# Patient Record
Sex: FEMALE | Race: WHITE | NOT HISPANIC OR LATINO | Employment: UNEMPLOYED | URBAN - METROPOLITAN AREA
[De-identification: names, ages, dates, MRNs, and addresses within clinical notes are randomized per-mention and may not be internally consistent; named-entity substitution may affect disease eponyms.]

---

## 2024-01-12 ENCOUNTER — APPOINTMENT (OUTPATIENT)
Dept: ULTRASOUND IMAGING | Facility: CLINIC | Age: 34
End: 2024-01-12
Attending: EMERGENCY MEDICINE
Payer: COMMERCIAL

## 2024-01-12 ENCOUNTER — HOSPITAL ENCOUNTER (EMERGENCY)
Facility: CLINIC | Age: 34
Discharge: HOME | End: 2024-01-12
Attending: EMERGENCY MEDICINE | Admitting: EMERGENCY MEDICINE
Payer: COMMERCIAL

## 2024-01-12 VITALS
HEART RATE: 81 BPM | DIASTOLIC BLOOD PRESSURE: 65 MMHG | TEMPERATURE: 98 F | OXYGEN SATURATION: 100 % | SYSTOLIC BLOOD PRESSURE: 104 MMHG | RESPIRATION RATE: 13 BRPM

## 2024-01-12 DIAGNOSIS — O03.4 INCOMPLETE MISCARRIAGE: ICD-10-CM

## 2024-01-12 DIAGNOSIS — N93.9 VAGINAL BLEEDING: ICD-10-CM

## 2024-01-12 LAB
ABO/RH(D): ABNORMAL
ANTIBODY ID: NORMAL
ANTIBODY SCREEN: POSITIVE
BASOPHILS # BLD AUTO: 0 10E3/UL (ref 0–0.2)
BASOPHILS NFR BLD AUTO: 1 %
EOSINOPHIL # BLD AUTO: 0.1 10E3/UL (ref 0–0.7)
EOSINOPHIL NFR BLD AUTO: 2 %
ERYTHROCYTE [DISTWIDTH] IN BLOOD BY AUTOMATED COUNT: 11.9 % (ref 10–15)
HCG INTACT+B SERPL-ACNC: 4496 MIU/ML
HCT VFR BLD AUTO: 38.3 % (ref 35–47)
HGB BLD-MCNC: 12.9 G/DL (ref 11.7–15.7)
HOLD SPECIMEN: NORMAL
IMM GRANULOCYTES # BLD: 0 10E3/UL
IMM GRANULOCYTES NFR BLD: 0 %
LYMPHOCYTES # BLD AUTO: 2 10E3/UL (ref 0.8–5.3)
LYMPHOCYTES NFR BLD AUTO: 31 %
MCH RBC QN AUTO: 32.1 PG (ref 26.5–33)
MCHC RBC AUTO-ENTMCNC: 33.7 G/DL (ref 31.5–36.5)
MCV RBC AUTO: 95 FL (ref 78–100)
MONOCYTES # BLD AUTO: 0.4 10E3/UL (ref 0–1.3)
MONOCYTES NFR BLD AUTO: 6 %
NEUTROPHILS # BLD AUTO: 3.9 10E3/UL (ref 1.6–8.3)
NEUTROPHILS NFR BLD AUTO: 60 %
NRBC # BLD AUTO: 0 10E3/UL
NRBC BLD AUTO-RTO: 0 /100
PLATELET # BLD AUTO: 229 10E3/UL (ref 150–450)
RBC # BLD AUTO: 4.02 10E6/UL (ref 3.8–5.2)
SPECIMEN EXPIRATION DATE: ABNORMAL
SPECIMEN EXPIRATION DATE: NORMAL
WBC # BLD AUTO: 6.5 10E3/UL (ref 4–11)

## 2024-01-12 PROCEDURE — 88342 IMHCHEM/IMCYTCHM 1ST ANTB: CPT | Mod: 26 | Performed by: PATHOLOGY

## 2024-01-12 PROCEDURE — 76801 OB US < 14 WKS SINGLE FETUS: CPT

## 2024-01-12 PROCEDURE — 88182 CELL MARKER STUDY: CPT | Performed by: OBSTETRICS & GYNECOLOGY

## 2024-01-12 PROCEDURE — 86900 BLOOD TYPING SEROLOGIC ABO: CPT | Performed by: EMERGENCY MEDICINE

## 2024-01-12 PROCEDURE — 96361 HYDRATE IV INFUSION ADD-ON: CPT

## 2024-01-12 PROCEDURE — 88342 IMHCHEM/IMCYTCHM 1ST ANTB: CPT | Mod: TC,XU | Performed by: OBSTETRICS & GYNECOLOGY

## 2024-01-12 PROCEDURE — 88305 TISSUE EXAM BY PATHOLOGIST: CPT | Mod: TC | Performed by: OBSTETRICS & GYNECOLOGY

## 2024-01-12 PROCEDURE — 258N000003 HC RX IP 258 OP 636: Performed by: EMERGENCY MEDICINE

## 2024-01-12 PROCEDURE — 84702 CHORIONIC GONADOTROPIN TEST: CPT | Performed by: EMERGENCY MEDICINE

## 2024-01-12 PROCEDURE — 36415 COLL VENOUS BLD VENIPUNCTURE: CPT | Performed by: EMERGENCY MEDICINE

## 2024-01-12 PROCEDURE — 86870 RBC ANTIBODY IDENTIFICATION: CPT | Performed by: EMERGENCY MEDICINE

## 2024-01-12 PROCEDURE — 85025 COMPLETE CBC W/AUTO DIFF WBC: CPT | Performed by: EMERGENCY MEDICINE

## 2024-01-12 PROCEDURE — 96374 THER/PROPH/DIAG INJ IV PUSH: CPT

## 2024-01-12 PROCEDURE — 99285 EMERGENCY DEPT VISIT HI MDM: CPT | Mod: 25

## 2024-01-12 PROCEDURE — 88341 IMHCHEM/IMCYTCHM EA ADD ANTB: CPT | Mod: 26 | Performed by: PATHOLOGY

## 2024-01-12 PROCEDURE — 250N000009 HC RX 250

## 2024-01-12 PROCEDURE — 88305 TISSUE EXAM BY PATHOLOGIST: CPT | Mod: 26 | Performed by: PATHOLOGY

## 2024-01-12 PROCEDURE — 250N000011 HC RX IP 250 OP 636: Performed by: OBSTETRICS & GYNECOLOGY

## 2024-01-12 PROCEDURE — 250N000013 HC RX MED GY IP 250 OP 250 PS 637: Performed by: OBSTETRICS & GYNECOLOGY

## 2024-01-12 RX ORDER — LIDOCAINE HYDROCHLORIDE 10 MG/ML
INJECTION, SOLUTION EPIDURAL; INFILTRATION; INTRACAUDAL; PERINEURAL
Status: COMPLETED
Start: 2024-01-12 | End: 2024-01-12

## 2024-01-12 RX ORDER — DOXYCYCLINE 100 MG/1
100 CAPSULE ORAL EVERY 12 HOURS SCHEDULED
Status: DISCONTINUED | OUTPATIENT
Start: 2024-01-12 | End: 2024-01-12 | Stop reason: HOSPADM

## 2024-01-12 RX ORDER — FENTANYL CITRATE 50 UG/ML
100 INJECTION, SOLUTION INTRAMUSCULAR; INTRAVENOUS ONCE
Status: COMPLETED | OUTPATIENT
Start: 2024-01-12 | End: 2024-01-12

## 2024-01-12 RX ADMIN — FENTANYL CITRATE 50 MCG: 0.05 INJECTION, SOLUTION INTRAMUSCULAR; INTRAVENOUS at 16:34

## 2024-01-12 RX ADMIN — LIDOCAINE HYDROCHLORIDE: 10 INJECTION, SOLUTION EPIDURAL; INFILTRATION; INTRACAUDAL; PERINEURAL at 16:45

## 2024-01-12 RX ADMIN — SODIUM CHLORIDE 1000 ML: 9 INJECTION, SOLUTION INTRAVENOUS at 17:00

## 2024-01-12 RX ADMIN — DOXYCYCLINE HYCLATE 100 MG: 100 CAPSULE ORAL at 18:21

## 2024-01-12 ASSESSMENT — ACTIVITIES OF DAILY LIVING (ADL)
ADLS_ACUITY_SCORE: 35

## 2024-01-12 NOTE — ED TRIAGE NOTES
Patient reports recent miscarriage about 1 week ago.  She reports light bleeding all week.  This morning she was going to a follow up appointment and bled through a pad and her clothing in about 30 minutes.  ABCs intact, A&Ox4.     Triage Assessment (Adult)       Row Name 01/12/24 1043          Triage Assessment    Airway WDL WDL        Respiratory WDL    Respiratory WDL WDL        Skin Circulation/Temperature WDL    Skin Circulation/Temperature WDL WDL        Cardiac WDL    Cardiac WDL WDL        Peripheral/Neurovascular WDL    Peripheral Neurovascular WDL WDL        Cognitive/Neuro/Behavioral WDL    Cognitive/Neuro/Behavioral WDL WDL

## 2024-01-12 NOTE — ED PROVIDER NOTES
History     Chief Complaint:  Vaginal Bleeding       HPI   Alana Clark is a 33 year old female, , presenting with vaginal bleeding. States that 3 weeks ago she began having a spontaneous miscarriage at 8 weeks; notes that the baby stopped growing at 6 weeks. There were plans to schedule a D&C but the patient was uncomfortable with this and preferred to pass on her own. One week ago today she made an appointment with OB. Over the weekend she experienced no abdominal cramping; she passed two large blood clots, and bleeding ever since has been light but steady. States she was going through one pad a day. This morning she was on her way to her appointment when she began bleeding through her pants with two pads on; she was advised to present to the ED. Endorses light cramping at this time. No lightheadedness, shortness of breath, abnormal bleeding or bruising, or history of clotting issues. No medical problems or daily medications. Denies history of previous miscarriages.    Independent Historian:   None - Patient Only    Review of External Notes:   No outpatient clinic notes available for review      Medications:    No daily medications    Past Medical History:    No chronic medical problems    Past Surgical History:    No past surgical history on file.     Physical Exam   Patient Vitals for the past 24 hrs:   BP Temp Temp src Pulse Resp SpO2   24 1043 129/82 98.5  F (36.9  C) Oral 103 18 98 %        Physical Exam  GEN: Adult female, reclined on the stretcher  EYES: PERRL, conjunctiva normal  ENT: Moist mucous membranes. Oropharynx clear. No exudate or lesions.  CV: Normal S1S2. Regular rate and rhythm. No murmurs, rubs, or gallops.  RESP: Clear to auscultation bilaterally. Normal effort. No wheezes, rales, or rhonchi.  GI: Abdomen is soft, nontender and nondistended.  GYN: Normal appearing external genitalia.  Blood with clots in the vaginal vault. Closed cervical os with small amount of bloody  discharge.  MSK: Moves all extremities normally. No edema.  Skin: Warm and dry. No rashes, lesions or ecchymoses. No petechiae.  Neuro: Alert and oriented.  Responds appropriately to all questions and commands. No focal abnormalities appreciated.  Psych: Normal affect. Pleasant.     Emergency Department Course       Imaging:  US OB <14 Weeks W Transvaginal   Final Result   IMPRESSION:       1. No convincing gestational sac, yolk sac or fetal pole.      2. Thickened endometrium (1.9 cm) with areas of apparent internal   vascularity that could reflect retained products of conception given   reported history. Clinically correlate and correlate with beta hCG.      3. Simple appearing fluid within the lower uterine   segment/endocervical canal.       ANGEL KAMINSKI MD            SYSTEM ID:  [id number]             Laboratory:  Labs Ordered and Resulted from Time of ED Arrival to Time of ED Departure   HCG QUANTITATIVE PREGNANCY - Abnormal       Result Value    hCG Quantitative 4,496 (*)    TYPE AND SCREEN, ADULT - Abnormal    ABO/RH(D) A NEG      Antibody Screen Positive (*)     SPECIMEN EXPIRATION DATE [id number]     CBC WITH PLATELETS AND DIFFERENTIAL    WBC Count 6.5      RBC Count 4.02      Hemoglobin 12.9      Hematocrit 38.3      MCV 95      MCH 32.1      MCHC 33.7      RDW 11.9      Platelet Count 229      % Neutrophils 60      % Lymphocytes 31      % Monocytes 6      % Eosinophils 2      % Basophils 1      % Immature Granulocytes 0      NRBCs per 100 WBC 0      Absolute Neutrophils 3.9      Absolute Lymphocytes 2.0      Absolute Monocytes 0.4      Absolute Eosinophils 0.1      Absolute Basophils 0.0      Absolute Immature Granulocytes 0.0      Absolute NRBCs 0.0     ANTIBODY IDENTIFICATION    Antibody Identification Anti-D post RhoGAM      SPECIMEN EXPIRATION DATE [id number]     ABO/RH TYPE AND SCREEN        Emergency Department Course & Assessments:       Interventions:  None    Assessments:  1053 I  entered the patient's room and obtained history.  I performed a exam with RN chaperone.  I reassessed the patient discussed findings.  Discussed recommendations from OB.  The patient is comfortable with the plan.    Independent Interpretation (X-rays, CTs, rhythm strip):  None    Consultations/Discussion of Management or Tests:  I discussed the patient with Dr. Herr of OB/GYN who will plan to do a bedside evacuation versus D&C in the operating room.  Will come to evaluate the patient in person.       Social Determinants of Health affecting care:   None    Disposition:  The patient is awaiting OB evaluation in the ED.  Her care is signed out to my colleague Dr. Llnaes while awaiting definitive care.    Impression & Plan      Medical Decision Making:  Alana Clark 33-year-old  with recent miscarriage with evidence of incomplete miscarriage on today's evaluation.  Probable products of conception are noted on ultrasound.  She has bleeding but not massive hemorrhage.  Hemodynamically normal.  She has a normal hemoglobin.  OB was consulted and recommend likely D&C but will see the patient here in the emergency department for possible bedside manual pump evacuation.  The patient is awaiting this care at this time, remaining stable.  All questions were answered prior to transfer of care.      Diagnosis:    ICD-10-CM    1. Incomplete miscarriage  O03.4       2. Vaginal bleeding  N93.9            Scribe Disclosure:  IBeka Hired, am serving as a scribe at 11:00 AM on 2024 to document services personally performed by Estelita Shipman MD based on my observations and the provider's statements to me.   2024   Estelita Shipman MD Jonkman, Tracy Dianne, MD  24 [phone]

## 2024-01-12 NOTE — H&P
ED H and P:    S: Pt had Missed ab at 9+ weeks with fetal size at 6+ weeks. She had been consulted and scheduled for D and C last week. She decided against the D and C and attempted cytotec. She passed a few blood clots and felt that the miscarriage was complete.   However, today she started with significant cramping and bleeding.     PMH: negative    PSH: vaginal deliveries x 3    Medicaitons: None    Allergies: NKDA    O:  /67   Pulse 90   Temp 98.5  F (36.9  C) (Oral)   Resp 16   SpO2 99%   SVE: visually dilated. Clot at os.   Extremities: no cords ,tenderness, erythema or edema.     Imaging:  US OB <14 Weeks W Transvaginal   Final Result   IMPRESSION:        1. No convincing gestational sac, yolk sac or fetal pole.       2. Thickened endometrium (1.9 cm) with areas of apparent internal   vascularity that could reflect retained products of conception given   reported history. Clinically correlate and correlate with beta hCG.       3. Simple appearing fluid within the lower uterine   segment/endocervical canal.      A/P: 34 yo with incomplete AB. Here with likely retained POC.   Discussed US findings and likely retained POC. Discussed options of: Cytotec, MVA or D and C in OR. Discussed risks of: incomplete procedure, infection, bleeding, perforation, scaring, pain, risks of pain medication.   Will discharge after procedure and follow up in clinic next week.     Leonarda Cta MD

## 2024-01-13 NOTE — ED PROVIDER NOTES
Patient was endorsed to me at end of shift by Dr. Shipman.  Patient had recently under a spontaneous miscarriage but continued to bleed and had retained products ultrasound.  OB, Dr. Cat, came to the ED and evaluated the patient.  She performed a MVA procedure in the ED which the patient tolerated well.  Briefly after the procedure, the patient did become hypotensive and near syncopal.  I suspect that this was a vasovagal reaction and possibly a reaction to the fentanyl that she received.  Patient is given IV fluid bolus and observed in the ED.  Her blood pressure normalized and she is able to walk around the ED without any symptoms of dizziness or near syncope.  We feel comfortable letting her go home at this time but we did discuss return precautions and recommended follow-up with OB as planned.     Juan Daniel Llanes MD  01/12/24 5380

## 2024-01-13 NOTE — PROCEDURES
Preoperative diagnosis: missed A/B at 9+ weeks by dates and 6+ weeks by size    Postoperative diagnosis: same    Procedure: D&C with a manual vacuum aspirator    Surgeon: Leonarda Cat MD     anesthesia:  50 mg fentanyl IV    Blood loss:  20 ml    Specimen:  Products of conception    Indications:  Patient is a 33-year-old  003 who at approximately 9 weeks gestation had a missed A/B She was given options for management including watchful waiting, induction with Cytotec, D and C.  Initially she schedule D and C and then called back to change to oral Cytotec induction.    She felt the procedure was completed with passing of blood clots however earlier today had a sudden increase in bleeding and cramping with minimal tissue passed.  Imaging shows heterogeneous material suggestive of retained products    Procedure: Patient was seen in the emergency department where risks benefits and alternatives were discussed and she did sign informed consent.  She had recently emptied her bladder.  She received 50 mg IV fentanyl.      A medium Graves speculum was inserted.  The cervix was cleansed with Betadine.  A paracervical block with 7 ml of 1% lidocaine was performed.      A single-tooth tenaculum was placed on the posterior lip of the cervix.  The cervix was dilated with ease to 10 mm.  A manual vacuum aspirate ir was placed into the uterine cavity and tissue was removed.  The tissue was placed into a specimen cup and a 2nd passage with the MVA was performed.  A uterine cry was felt in all 4 quadrants.      The MVA and tenaculum removed and her cervix was found to be hemostatic.  She was given a oral dose of doxycycline and discharged home with routine teaching.

## 2024-01-24 LAB
DNA INDEX SPEC FC-ACNC: 1
DNA PLOIDY SPEC FC: NORMAL
PATHOLOGY STUDY: NORMAL

## 2024-02-13 LAB
PATH REPORT.ADDENDUM SPEC: NORMAL
PATH REPORT.COMMENTS IMP SPEC: NORMAL
PATH REPORT.FINAL DX SPEC: NORMAL
PATH REPORT.GROSS SPEC: NORMAL
PATH REPORT.MICROSCOPIC SPEC OTHER STN: NORMAL
PATH REPORT.RELEVANT HX SPEC: NORMAL
PHOTO IMAGE: NORMAL

## 2024-05-19 ENCOUNTER — HEALTH MAINTENANCE LETTER (OUTPATIENT)
Age: 34
End: 2024-05-19

## 2025-06-08 ENCOUNTER — HEALTH MAINTENANCE LETTER (OUTPATIENT)
Age: 35
End: 2025-06-08